# Patient Record
Sex: FEMALE | Race: WHITE | URBAN - METROPOLITAN AREA
[De-identification: names, ages, dates, MRNs, and addresses within clinical notes are randomized per-mention and may not be internally consistent; named-entity substitution may affect disease eponyms.]

---

## 2020-09-10 ENCOUNTER — CLAIMS CREATED FROM THE CLAIM WINDOW (OUTPATIENT)
Dept: URBAN - METROPOLITAN AREA CLINIC 4 | Facility: CLINIC | Age: 70
End: 2020-09-10
Payer: MEDICARE

## 2020-09-10 ENCOUNTER — OFFICE VISIT (OUTPATIENT)
Dept: URBAN - METROPOLITAN AREA SURGERY CENTER 13 | Facility: SURGERY CENTER | Age: 70
End: 2020-09-10
Payer: MEDICARE

## 2020-09-10 DIAGNOSIS — C44.520 SQUAMOUS CELL CARCINOMA OF ANAL SKIN: ICD-10-CM

## 2020-09-10 DIAGNOSIS — K63.5 BENIGN COLON POLYP: ICD-10-CM

## 2020-09-10 DIAGNOSIS — K63.89 OTHER SPECIFIED DISEASES OF INTESTINE: ICD-10-CM

## 2020-09-10 DIAGNOSIS — D12.5 BENIGN NEOPLASM OF SIGMOID COLON: ICD-10-CM

## 2020-09-10 DIAGNOSIS — Z80.0 FAMILY HISTORY MALIGNANT NEOPLASM OF BILIARY TRACT: ICD-10-CM

## 2020-09-10 DIAGNOSIS — D01.2 CARCINOMA IN SITU IN RECTAL POLYP: ICD-10-CM

## 2020-09-10 PROCEDURE — 45380 COLONOSCOPY AND BIOPSY: CPT | Performed by: INTERNAL MEDICINE

## 2020-09-10 PROCEDURE — 88341 IMHCHEM/IMCYTCHM EA ADD ANTB: CPT | Performed by: PATHOLOGY

## 2020-09-10 PROCEDURE — 88305 TISSUE EXAM BY PATHOLOGIST: CPT | Performed by: PATHOLOGY

## 2020-09-10 PROCEDURE — 88342 IMHCHEM/IMCYTCHM 1ST ANTB: CPT | Performed by: PATHOLOGY

## 2020-09-10 PROCEDURE — G9935 CANC NOT DETECTD DURING SRCN: HCPCS | Performed by: INTERNAL MEDICINE

## 2020-09-10 PROCEDURE — 88313 SPECIAL STAINS GROUP 2: CPT | Performed by: PATHOLOGY

## 2020-09-10 PROCEDURE — G8907 PT DOC NO EVENTS ON DISCHARG: HCPCS | Performed by: INTERNAL MEDICINE

## 2020-09-10 NOTE — EXAM-PHYSICAL EXAM
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 I have spoken with Mrs Barkley and she has an appt w her hematologist at [facility], Dr Kathrine Simmons ([phone]) next Monday, and I have a call in to Dr Simmons to discuss, as these tumors treated w chemo and radiation. Other bx shoed no inflammation and HP polyp. May want to use xifaxan for diarrhea. Spoke on 9/14  w Dr Simmons who ewill arrange imaging and I will order EUS of anus to help w staging.

## 2020-09-15 ENCOUNTER — LAB OUTSIDE AN ENCOUNTER (OUTPATIENT)
Dept: URBAN - METROPOLITAN AREA SURGERY CENTER 13 | Facility: SURGERY CENTER | Age: 70
End: 2020-09-15

## 2020-09-15 ENCOUNTER — TELEPHONE ENCOUNTER (OUTPATIENT)
Dept: URBAN - METROPOLITAN AREA CLINIC 5 | Facility: CLINIC | Age: 70
End: 2020-09-15

## 2020-09-22 ENCOUNTER — TELEPHONE ENCOUNTER (OUTPATIENT)
Dept: URBAN - METROPOLITAN AREA CLINIC 5 | Facility: CLINIC | Age: 70
End: 2020-09-22

## 2020-09-22 RX ORDER — CALCITRIOL 0.5 UG/1
TAKE 1 CAPSULE (0.5 MCG) BY ORAL ROUTE ONCE DAILY CAPSULE, LIQUID FILLED ORAL 1
Qty: 0 | Refills: 0 | COMMUNITY
Start: 1900-01-01

## 2020-09-22 RX ORDER — HYOSCYAMINE SULFATE 0.12 MG/1
1 TABLET UNDER THE TONGUE AND ALLOW TO DISSOLVE  AS NEEDED TABLET, ORALLY DISINTEGRATING ORAL
Qty: 90 TABLET | Refills: 11 | OUTPATIENT
Start: 2020-09-22 | End: 2021-09-17

## 2020-09-22 RX ORDER — DIPHENOXYLATE HYDROCHLORIDE AND ATROPINE SULFATE 2.5; .025 MG/1; MG/1
1 -2 TABLETS AS NEEDED TABLET ORAL
Qty: 100 TABLET | Refills: 3 | OUTPATIENT

## 2020-09-22 RX ORDER — SERTRALINE 100 MG/1
TAKE 1 TABLET (100 MG) BY ORAL ROUTE ONCE DAILY TABLET, FILM COATED ORAL 1
Qty: 0 | Refills: 0 | COMMUNITY
Start: 1900-01-01

## 2020-09-22 RX ORDER — DIPHENOXYLATE HCL/ATROPINE 2.5-.025MG
TABLET ORAL
Qty: 0 | Refills: 0 | COMMUNITY
Start: 1900-01-01

## 2020-09-22 RX ORDER — PREDNISONE 5 MG/1
TAKE 1 TABLET (5 MG) BY ORAL ROUTE ONCE DAILY TABLET ORAL 1
Qty: 0 | Refills: 0 | COMMUNITY
Start: 1900-01-01

## 2020-09-22 RX ORDER — LOPERAMIDE HYDROCHLORIDE 1 MG/7.5ML
SOLUTION ORAL
Qty: 0 | Refills: 0 | COMMUNITY
Start: 1900-01-01

## 2020-09-22 RX ORDER — OMEPRAZOLE 20 MG/1
TAKE 1 CAPSULE (20 MG) BY ORAL ROUTE ONCE DAILY BEFORE A MEAL CAPSULE, DELAYED RELEASE ORAL 1
Qty: 0 | Refills: 0 | COMMUNITY
Start: 1900-01-01

## 2020-09-22 RX ORDER — GABAPENTIN 300 MG/1
TAKE 1 CAPSULE BY ORAL ROUTE ONCE A DAY (AT BEDTIME) CAPSULE ORAL 1
Qty: 0 | Refills: 0 | COMMUNITY
Start: 1900-01-01

## 2020-09-22 RX ORDER — TACROLIMUS 5 MG/1
TAKE 1 CAPSULE BY ORAL ROUTE TWICE DAILY CAPSULE, GELATIN COATED ORAL 2
Qty: 0 | Refills: 0 | COMMUNITY
Start: 1900-01-01

## 2020-09-22 RX ORDER — MUPIROCIN 20 MG/G
APPLY A SMALL AMOUNT TO THE AFFECTED AREA BY TOPICAL ROUTE 3 TIMES PER DAY OINTMENT TOPICAL
Qty: 1 | Refills: 0 | COMMUNITY
Start: 1900-01-01

## 2020-09-22 RX ORDER — LOSARTAN POTASSIUM 50 MG/1
TAKE 2 TABLETS (100 MG) BY ORAL ROUTE ONCE DAILY TABLET ORAL 1
Qty: 0 | Refills: 0 | COMMUNITY
Start: 1900-01-01

## 2020-09-22 RX ORDER — AZATHIOPRINE 50 1/1
TAKE 2 TABLETS (100 MG) BY ORAL ROUTE ONCE DAILY TABLET ORAL 1
Qty: 0 | Refills: 0 | COMMUNITY
Start: 1900-01-01

## 2020-09-22 RX ORDER — CLOBETASOL PROPIONATE 0.5 MG/G
APPLY A THIN LAYER TO THE AFFECTED AREA(S) BY TOPICAL ROUTE 2 TIMES PER DAY OINTMENT TOPICAL 2
Qty: 1 | Refills: 0 | COMMUNITY
Start: 1900-01-01

## 2020-09-30 ENCOUNTER — OFFICE VISIT (OUTPATIENT)
Dept: URBAN - METROPOLITAN AREA TELEHEALTH 2 | Facility: TELEHEALTH | Age: 70
End: 2020-09-30

## 2020-10-09 ENCOUNTER — TELEPHONE ENCOUNTER (OUTPATIENT)
Dept: URBAN - METROPOLITAN AREA MEDICAL CENTER 24 | Facility: MEDICAL CENTER | Age: 70
End: 2020-10-09

## 2020-10-13 ENCOUNTER — OFFICE VISIT (OUTPATIENT)
Dept: URBAN - METROPOLITAN AREA MEDICAL CENTER 24 | Facility: MEDICAL CENTER | Age: 70
End: 2020-10-13
Payer: MEDICARE

## 2020-10-13 DIAGNOSIS — K62.89 ANAL BURNING: ICD-10-CM

## 2020-10-13 PROCEDURE — 45341 SIGMOIDOSCOPY W/ULTRASOUND: CPT | Performed by: INTERNAL MEDICINE

## 2020-12-02 ENCOUNTER — DASHBOARD ENCOUNTERS (OUTPATIENT)
Age: 70
End: 2020-12-02

## 2020-12-02 ENCOUNTER — OFFICE VISIT (OUTPATIENT)
Dept: URBAN - METROPOLITAN AREA TELEHEALTH 2 | Facility: TELEHEALTH | Age: 70
End: 2020-12-02

## 2020-12-02 RX ORDER — MUPIROCIN 20 MG/G
APPLY A SMALL AMOUNT TO THE AFFECTED AREA BY TOPICAL ROUTE 3 TIMES PER DAY OINTMENT TOPICAL
Qty: 1 | Refills: 0 | COMMUNITY
Start: 1900-01-01

## 2020-12-02 RX ORDER — CALCITRIOL 0.5 UG/1
TAKE 1 CAPSULE (0.5 MCG) BY ORAL ROUTE ONCE DAILY CAPSULE, LIQUID FILLED ORAL 1
Qty: 0 | Refills: 0 | COMMUNITY
Start: 1900-01-01

## 2020-12-02 RX ORDER — PREDNISONE 5 MG/1
TAKE 1 TABLET (5 MG) BY ORAL ROUTE ONCE DAILY TABLET ORAL 1
Qty: 0 | Refills: 0 | COMMUNITY
Start: 1900-01-01

## 2020-12-02 RX ORDER — HYOSCYAMINE SULFATE 0.12 MG/1
1 TABLET UNDER THE TONGUE AND ALLOW TO DISSOLVE  AS NEEDED TABLET, ORALLY DISINTEGRATING ORAL
Qty: 90 TABLET | Refills: 11 | Status: ACTIVE | COMMUNITY
Start: 2020-09-22 | End: 2021-09-17

## 2020-12-02 RX ORDER — DIPHENOXYLATE HCL/ATROPINE 2.5-.025MG
TABLET ORAL
Qty: 0 | Refills: 0 | COMMUNITY
Start: 1900-01-01

## 2020-12-02 RX ORDER — LOSARTAN POTASSIUM 50 MG/1
TAKE 2 TABLETS (100 MG) BY ORAL ROUTE ONCE DAILY TABLET ORAL 1
Qty: 0 | Refills: 0 | COMMUNITY
Start: 1900-01-01

## 2020-12-02 RX ORDER — GABAPENTIN 300 MG/1
TAKE 1 CAPSULE BY ORAL ROUTE ONCE A DAY (AT BEDTIME) CAPSULE ORAL 1
Qty: 0 | Refills: 0 | COMMUNITY
Start: 1900-01-01

## 2020-12-02 RX ORDER — SERTRALINE 100 MG/1
TAKE 1 TABLET (100 MG) BY ORAL ROUTE ONCE DAILY TABLET, FILM COATED ORAL 1
Qty: 0 | Refills: 0 | COMMUNITY
Start: 1900-01-01

## 2020-12-02 RX ORDER — DIPHENOXYLATE HYDROCHLORIDE AND ATROPINE SULFATE 2.5; .025 MG/1; MG/1
1 -2 TABLETS AS NEEDED TABLET ORAL
Qty: 100 TABLET | Refills: 3 | Status: ACTIVE | COMMUNITY

## 2020-12-02 RX ORDER — TACROLIMUS 5 MG/1
TAKE 1 CAPSULE BY ORAL ROUTE TWICE DAILY CAPSULE, GELATIN COATED ORAL 2
Qty: 0 | Refills: 0 | COMMUNITY
Start: 1900-01-01

## 2020-12-02 RX ORDER — CLOBETASOL PROPIONATE 0.5 MG/G
APPLY A THIN LAYER TO THE AFFECTED AREA(S) BY TOPICAL ROUTE 2 TIMES PER DAY OINTMENT TOPICAL 2
Qty: 1 | Refills: 0 | COMMUNITY
Start: 1900-01-01

## 2020-12-02 RX ORDER — OMEPRAZOLE 20 MG/1
TAKE 1 CAPSULE (20 MG) BY ORAL ROUTE ONCE DAILY BEFORE A MEAL CAPSULE, DELAYED RELEASE ORAL 1
Qty: 0 | Refills: 0 | COMMUNITY
Start: 1900-01-01

## 2020-12-02 RX ORDER — LOPERAMIDE HYDROCHLORIDE 1 MG/7.5ML
SOLUTION ORAL
Qty: 0 | Refills: 0 | COMMUNITY
Start: 1900-01-01

## 2020-12-02 RX ORDER — AZATHIOPRINE 50 1/1
TAKE 2 TABLETS (100 MG) BY ORAL ROUTE ONCE DAILY TABLET ORAL 1
Qty: 0 | Refills: 0 | COMMUNITY
Start: 1900-01-01

## 2022-05-27 ENCOUNTER — OUT OF OFFICE VISIT (OUTPATIENT)
Dept: URBAN - METROPOLITAN AREA MEDICAL CENTER 1 | Facility: MEDICAL CENTER | Age: 72
End: 2022-05-27
Payer: MEDICARE

## 2022-05-27 DIAGNOSIS — Z79.01 ANTICOAGULANT LONG-TERM USE: ICD-10-CM

## 2022-05-27 DIAGNOSIS — K62.5 ANAL BLEEDING: ICD-10-CM

## 2022-05-27 DIAGNOSIS — Z85.048 HISTORY OF ANAL CANCER: ICD-10-CM

## 2022-05-27 PROCEDURE — G8427 DOCREV CUR MEDS BY ELIG CLIN: HCPCS | Performed by: INTERNAL MEDICINE

## 2022-05-27 PROCEDURE — 99222 1ST HOSP IP/OBS MODERATE 55: CPT | Performed by: INTERNAL MEDICINE

## 2022-05-27 PROCEDURE — 99232 SBSQ HOSP IP/OBS MODERATE 35: CPT | Performed by: INTERNAL MEDICINE

## 2022-05-28 ENCOUNTER — OUT OF OFFICE VISIT (OUTPATIENT)
Dept: URBAN - METROPOLITAN AREA MEDICAL CENTER 1 | Facility: MEDICAL CENTER | Age: 72
End: 2022-05-28
Payer: MEDICARE

## 2022-05-28 DIAGNOSIS — Z85.048 HISTORY OF ANAL CANCER: ICD-10-CM

## 2022-05-28 DIAGNOSIS — Z79.01 ANTICOAGULANT LONG-TERM USE: ICD-10-CM

## 2022-05-28 DIAGNOSIS — K62.5 ANAL BLEEDING: ICD-10-CM

## 2022-05-28 PROCEDURE — 99232 SBSQ HOSP IP/OBS MODERATE 35: CPT | Performed by: NURSE PRACTITIONER

## 2022-05-31 ENCOUNTER — OUT OF OFFICE VISIT (OUTPATIENT)
Dept: URBAN - METROPOLITAN AREA MEDICAL CENTER 1 | Facility: MEDICAL CENTER | Age: 72
End: 2022-05-31
Payer: MEDICARE

## 2022-05-31 DIAGNOSIS — B02.9 HERPES ZOSTER: ICD-10-CM

## 2022-05-31 DIAGNOSIS — Z85.048 HISTORY OF ANAL CANCER: ICD-10-CM

## 2022-05-31 DIAGNOSIS — N18.6 ANEMIA DUE TO CHRONIC KIDNEY DISEASE, ON CHRONIC DIALYSIS: ICD-10-CM

## 2022-05-31 DIAGNOSIS — K62.5 ANAL BLEEDING: ICD-10-CM

## 2022-05-31 PROCEDURE — 99232 SBSQ HOSP IP/OBS MODERATE 35: CPT | Performed by: NURSE PRACTITIONER

## 2022-06-02 ENCOUNTER — OUT OF OFFICE VISIT (OUTPATIENT)
Dept: URBAN - METROPOLITAN AREA MEDICAL CENTER 1 | Facility: MEDICAL CENTER | Age: 72
End: 2022-06-02
Payer: MEDICARE

## 2022-06-02 DIAGNOSIS — K62.89 ACUTE PROCTITIS: ICD-10-CM

## 2022-06-02 DIAGNOSIS — K62.5 ANAL BLEEDING: ICD-10-CM

## 2022-06-02 PROCEDURE — 45331 SIGMOIDOSCOPY AND BIOPSY: CPT | Performed by: INTERNAL MEDICINE

## 2022-06-03 ENCOUNTER — OUT OF OFFICE VISIT (OUTPATIENT)
Dept: URBAN - METROPOLITAN AREA MEDICAL CENTER 1 | Facility: MEDICAL CENTER | Age: 72
End: 2022-06-03
Payer: MEDICARE

## 2022-06-03 DIAGNOSIS — Z85.048 HISTORY OF ANAL CANCER: ICD-10-CM

## 2022-06-03 DIAGNOSIS — N18.6 ANEMIA DUE TO CHRONIC KIDNEY DISEASE, ON CHRONIC DIALYSIS: ICD-10-CM

## 2022-06-03 DIAGNOSIS — B02.9 HERPES ZOSTER: ICD-10-CM

## 2022-06-03 DIAGNOSIS — K62.5 ANAL BLEEDING: ICD-10-CM

## 2022-06-03 PROCEDURE — 99232 SBSQ HOSP IP/OBS MODERATE 35: CPT | Performed by: PHYSICIAN ASSISTANT

## 2023-04-10 ENCOUNTER — OUT OF OFFICE VISIT (OUTPATIENT)
Dept: URBAN - METROPOLITAN AREA MEDICAL CENTER 24 | Facility: MEDICAL CENTER | Age: 73
End: 2023-04-10

## 2023-04-10 ENCOUNTER — CLAIMS CREATED FROM THE CLAIM WINDOW (OUTPATIENT)
Dept: URBAN - METROPOLITAN AREA MEDICAL CENTER 24 | Facility: MEDICAL CENTER | Age: 73
End: 2023-04-10
Payer: MEDICARE

## 2023-04-10 DIAGNOSIS — R19.5 ABNORMAL CONSISTENCY OF STOOL: ICD-10-CM

## 2023-04-10 DIAGNOSIS — N18.6 ANEMIA DUE TO CHRONIC KIDNEY DISEASE, ON CHRONIC DIALYSIS: ICD-10-CM

## 2023-04-10 DIAGNOSIS — Z85.048 HISTORY OF ANAL CANCER: ICD-10-CM

## 2023-04-10 DIAGNOSIS — D64.89 ANEMIA DUE TO OTHER CAUSE: ICD-10-CM

## 2023-04-10 PROCEDURE — 99232 SBSQ HOSP IP/OBS MODERATE 35: CPT | Performed by: INTERNAL MEDICINE

## 2023-04-10 PROCEDURE — 99222 1ST HOSP IP/OBS MODERATE 55: CPT | Performed by: INTERNAL MEDICINE

## 2023-04-10 PROCEDURE — G8427 DOCREV CUR MEDS BY ELIG CLIN: HCPCS | Performed by: INTERNAL MEDICINE
